# Patient Record
Sex: MALE | Race: WHITE | NOT HISPANIC OR LATINO | URBAN - METROPOLITAN AREA
[De-identification: names, ages, dates, MRNs, and addresses within clinical notes are randomized per-mention and may not be internally consistent; named-entity substitution may affect disease eponyms.]

---

## 2022-03-25 ENCOUNTER — AMBULATORY SURGICAL CENTER (OUTPATIENT)
Dept: URBAN - METROPOLITAN AREA SURGERY 2 | Facility: SURGERY | Age: 57
End: 2022-03-25
Payer: COMMERCIAL

## 2022-03-25 ENCOUNTER — OFFICE (OUTPATIENT)
Dept: URBAN - METROPOLITAN AREA PATHOLOGY 22 | Facility: PATHOLOGY | Age: 57
End: 2022-03-25
Payer: COMMERCIAL

## 2022-03-25 VITALS
OXYGEN SATURATION: 97.3 % | DIASTOLIC BLOOD PRESSURE: 104 MMHG | SYSTOLIC BLOOD PRESSURE: 133 MMHG | DIASTOLIC BLOOD PRESSURE: 65 MMHG | HEART RATE: 67 BPM | DIASTOLIC BLOOD PRESSURE: 104 MMHG | RESPIRATION RATE: 17 BRPM | DIASTOLIC BLOOD PRESSURE: 66 MMHG | OXYGEN SATURATION: 96 % | TEMPERATURE: 98.5 F | HEIGHT: 74 IN | DIASTOLIC BLOOD PRESSURE: 65 MMHG | RESPIRATION RATE: 16 BRPM | HEART RATE: 65 BPM | HEART RATE: 67 BPM | RESPIRATION RATE: 17 BRPM | DIASTOLIC BLOOD PRESSURE: 104 MMHG | OXYGEN SATURATION: 95 % | OXYGEN SATURATION: 95 % | SYSTOLIC BLOOD PRESSURE: 156 MMHG | SYSTOLIC BLOOD PRESSURE: 131 MMHG | OXYGEN SATURATION: 97.3 % | HEART RATE: 64 BPM | SYSTOLIC BLOOD PRESSURE: 122 MMHG | DIASTOLIC BLOOD PRESSURE: 69 MMHG | HEART RATE: 59 BPM | SYSTOLIC BLOOD PRESSURE: 156 MMHG | DIASTOLIC BLOOD PRESSURE: 69 MMHG | RESPIRATION RATE: 14 BRPM | HEART RATE: 64 BPM | HEART RATE: 69 BPM | DIASTOLIC BLOOD PRESSURE: 62 MMHG | SYSTOLIC BLOOD PRESSURE: 133 MMHG | OXYGEN SATURATION: 94 % | DIASTOLIC BLOOD PRESSURE: 66 MMHG | WEIGHT: 217 LBS | HEART RATE: 59 BPM | RESPIRATION RATE: 14 BRPM | SYSTOLIC BLOOD PRESSURE: 131 MMHG | DIASTOLIC BLOOD PRESSURE: 62 MMHG | DIASTOLIC BLOOD PRESSURE: 62 MMHG | OXYGEN SATURATION: 97.3 % | OXYGEN SATURATION: 95 % | RESPIRATION RATE: 16 BRPM | HEIGHT: 74 IN | HEART RATE: 65 BPM | HEART RATE: 67 BPM | DIASTOLIC BLOOD PRESSURE: 66 MMHG | RESPIRATION RATE: 17 BRPM | OXYGEN SATURATION: 96 % | OXYGEN SATURATION: 98 % | SYSTOLIC BLOOD PRESSURE: 131 MMHG | HEART RATE: 59 BPM | SYSTOLIC BLOOD PRESSURE: 122 MMHG | HEART RATE: 69 BPM | OXYGEN SATURATION: 94 % | SYSTOLIC BLOOD PRESSURE: 124 MMHG | HEART RATE: 65 BPM | TEMPERATURE: 97.3 F | OXYGEN SATURATION: 94 % | SYSTOLIC BLOOD PRESSURE: 156 MMHG | SYSTOLIC BLOOD PRESSURE: 124 MMHG | TEMPERATURE: 98.5 F | SYSTOLIC BLOOD PRESSURE: 122 MMHG | RESPIRATION RATE: 14 BRPM | TEMPERATURE: 97.3 F | OXYGEN SATURATION: 96 % | HEIGHT: 74 IN | DIASTOLIC BLOOD PRESSURE: 69 MMHG | HEART RATE: 64 BPM | SYSTOLIC BLOOD PRESSURE: 124 MMHG | DIASTOLIC BLOOD PRESSURE: 65 MMHG | TEMPERATURE: 97.3 F | HEART RATE: 69 BPM | WEIGHT: 217 LBS | OXYGEN SATURATION: 98 % | WEIGHT: 217 LBS | OXYGEN SATURATION: 98 % | RESPIRATION RATE: 16 BRPM | TEMPERATURE: 98.5 F | SYSTOLIC BLOOD PRESSURE: 133 MMHG

## 2022-03-25 DIAGNOSIS — K63.5 POLYP OF COLON: ICD-10-CM

## 2022-03-25 DIAGNOSIS — Z12.11 ENCOUNTER FOR SCREENING FOR MALIGNANT NEOPLASM OF COLON: ICD-10-CM

## 2022-03-25 DIAGNOSIS — K62.1 RECTAL POLYP: ICD-10-CM

## 2022-03-25 DIAGNOSIS — K57.30 DIVERTICULOSIS OF LARGE INTESTINE WITHOUT PERFORATION OR ABS: ICD-10-CM

## 2022-03-25 PROCEDURE — 45380 COLONOSCOPY AND BIOPSY: CPT | Mod: 33 | Performed by: INTERNAL MEDICINE

## 2022-03-25 NOTE — SERVICEHPINOTES
Mr. Tsai is a 58 yo WM here for high risk screening colonoscopy (father with colon cancer in his 70s).

## 2022-03-25 NOTE — SERVICEHPINOTES
Mr. Tsai is a 56 yo WM here for high risk screening colonoscopy (father with colon cancer in his 70s).

## 2022-07-26 NOTE — SERVICENOTES
Virtual Visit: Established Patient   This visit was conducted via zoom using secure and encrypted videoconferencing technology.   The patient was in their home in the state of [place].    The patient's identity was confirmed and verbal consent was obtained for this virtual visit.    Subjective:   CC:   Chief Complaint   Patient presents with   • Coronavirus Screening     Exposed to wife who tested positive for COVID. He tested positive on 07/25.      Isak Ruff Jr. is a 76 y.o. male presenting for evaluation and management of a positive covid test.    States that he tested positive for covid on 7/25  Am. He is uptodate  with covide booster #2.  Having fatigue, nasal congestion, dizziness, and chills. Denies any fevers. All these symptoms started Sunday night into Monday morning. Denies any SOB, cough. Wife tested positive for covid on 7/22. Started on paxlovid. Stopped the advair as it made him hoarse. Stopped this about a month ago. Has albuterol which he used last night. Prior to that it was over 2 months ago. Denies any respiratory issues. Most recent labs reviewed.     ROS   See above.    Current medicines (including changes today)  Current Outpatient Medications   Medication Sig Dispense Refill   • Nirmatrelvir & Ritonavir 20 x 150 MG & 10 x 100MG Tablet Therapy Pack Take 300 mg nirmatrelvir (two 150 mg tablets) with 100 mg ritonavir (one 100 mg tablet) by mouth, with all three tablets taken together twice daily for 5 days. 30 Each 0   • Fexofenadine-Pseudoephedrine (ALLEGRA-D 12 HOUR PO) Take 60 mg by mouth every 12 hours as needed.     • fexofenadine-pseudoephedrine (ALLEGRA-D)  MG per tablet Take 1 Tablet by mouth 2 times a day. 200 Tablet 3   • fluticasone (FLONASE) 50 MCG/ACT nasal spray Administer 1 Spray into affected nostril(S) every day. 16 g 3   • ofloxacin (OCUFLOX) 0.3 % Solution      • metFORMIN (GLUCOPHAGE) 500 MG Tab TAKE ONE TABLET BY MOUTH TWICE A DAY WITH MEALS 200 Tablet 3   •  Start Time: 11:15
Cecum: 11:17
TI Intubation: yes
End time: 11:31 famotidine (PEPCID) 20 MG Tab Take 1 Tablet by mouth every evening. 100 Tablet 3   • FLUoxetine (PROZAC) 20 MG Cap Take 1 Capsule by mouth every day. 100 Capsule 3   • buPROPion (WELLBUTRIN XL) 150 MG XL tablet Take 1 Tablet by mouth every morning. 100 Tablet 3   • albuterol 108 (90 Base) MCG/ACT Aero Soln inhalation aerosol Inhale 2 Puffs every 6 hours as needed for Shortness of Breath. 8.5 g 3   • montelukast (SINGULAIR) 10 MG Tab Take 1 Tablet by mouth every evening. 100 Tablet 3   • tadalafil (CIALIS) 10 MG tablet Take 1 Tablet by mouth every day. 90 Tablet 3   • pseudoephedrine (SUDAFED) 30 MG Tab Take 60 mg by mouth every four hours as needed for Congestion.     • Menaquinone-7 (VITAMIN K2 PO) Take  by mouth. Included in co Q10 supplement     • Multiple Vitamins-Minerals (MULTIVITAMIN PO) Take  by mouth. [address]     • Omega-3 Fatty Acids (FISH OIL) 1000 MG Cap capsule Take 1 Cap by mouth 3 times a day, with meals. 90 Cap 3   • DHEA 25 MG Tab Take 25 mg by mouth every day.       No current facility-administered medications for this visit.       Patient Active Problem List    Diagnosis Date Noted   • COVID-19 virus infection 07/26/2022   • Skin lesion 11/16/2021   • Gastroesophageal reflux disease without esophagitis 11/16/2021   • Osteopenia of multiple sites 04/01/2021   • Murmur 04/01/2021   • Erectile dysfunction 02/19/2020   • Impaired fasting blood sugar 02/19/2020   • Conductive hearing loss, bilateral- wears hearing aids 02/19/2020   • Benign prostatic hyperplasia with urinary frequency 02/19/2020   • Moderate persistent asthma 02/19/2020   • Hyperlipidemia 01/28/2019   • Recurrent major depressive disorder, in full remission (HCC) 01/28/2019   • Allergy 01/28/2019        Objective:   There were no vitals taken for this visit.    Physical Exam:  Constitutional: Alert, no distress, well-groomed.  Skin: No rashes in visible areas.  Eye: Round. Conjunctiva clear, lids normal. No icterus.   ENMT: Lips  pink without lesions, good dentition, moist mucous membranes. Phonation normal.  Neck: No masses, no thyromegaly. Moves freely without pain.  Respiratory: Unlabored respiratory effort, no cough or audible wheeze  Psych: Alert and oriented x3, normal affect and mood.     Assessment and Plan:   The following treatment plan was discussed:     1. COVID-19 virus infection  New issue. Currently has  Mild symptoms. Precautions d/w patient. Hold the wellbutrin while on paxlovid.  Keep us updated  - Nirmatrelvir & Ritonavir 20 x 150 MG & 10 x 100MG Tablet Therapy Pack; Take 300 mg nirmatrelvir (two 150 mg tablets) with 100 mg ritonavir (one 100 mg tablet) by mouth, with all three tablets taken together twice daily for 5 days.  Dispense: 30 Each; Refill: 0      Follow-up: No follow-ups on file.